# Patient Record
Sex: MALE | Race: WHITE | NOT HISPANIC OR LATINO | Employment: FULL TIME | URBAN - NONMETROPOLITAN AREA
[De-identification: names, ages, dates, MRNs, and addresses within clinical notes are randomized per-mention and may not be internally consistent; named-entity substitution may affect disease eponyms.]

---

## 2018-02-01 ENCOUNTER — DOCUMENTATION ONLY (OUTPATIENT)
Dept: FAMILY MEDICINE | Facility: OTHER | Age: 28
End: 2018-02-01

## 2018-02-01 PROBLEM — B07.9 VIRAL WARTS: Status: ACTIVE | Noted: 2018-02-01

## 2018-02-01 PROBLEM — L70.9 ACNE, MILD: Status: ACTIVE | Noted: 2018-02-01

## 2018-11-23 ENCOUNTER — OFFICE VISIT (OUTPATIENT)
Dept: FAMILY MEDICINE | Facility: OTHER | Age: 28
End: 2018-11-23
Attending: NURSE PRACTITIONER
Payer: COMMERCIAL

## 2018-11-23 VITALS
BODY MASS INDEX: 29.64 KG/M2 | SYSTOLIC BLOOD PRESSURE: 100 MMHG | WEIGHT: 200.13 LBS | DIASTOLIC BLOOD PRESSURE: 70 MMHG | HEIGHT: 69 IN | TEMPERATURE: 97.7 F | HEART RATE: 68 BPM

## 2018-11-23 DIAGNOSIS — Z23 NEED FOR TDAP VACCINATION: Primary | ICD-10-CM

## 2018-11-23 DIAGNOSIS — F41.1 GAD (GENERALIZED ANXIETY DISORDER): ICD-10-CM

## 2018-11-23 PROCEDURE — 90471 IMMUNIZATION ADMIN: CPT | Performed by: NURSE PRACTITIONER

## 2018-11-23 PROCEDURE — 90715 TDAP VACCINE 7 YRS/> IM: CPT | Performed by: NURSE PRACTITIONER

## 2018-11-23 PROCEDURE — 99212 OFFICE O/P EST SF 10 MIN: CPT | Mod: 25 | Performed by: NURSE PRACTITIONER

## 2018-11-23 RX ORDER — CITALOPRAM HYDROBROMIDE 20 MG/1
20 TABLET ORAL DAILY
Qty: 30 TABLET | Refills: 1 | Status: SHIPPED | OUTPATIENT
Start: 2018-11-23 | End: 2018-12-21

## 2018-11-23 ASSESSMENT — ANXIETY QUESTIONNAIRES
3. WORRYING TOO MUCH ABOUT DIFFERENT THINGS: NEARLY EVERY DAY
1. FEELING NERVOUS, ANXIOUS, OR ON EDGE: NEARLY EVERY DAY
6. BECOMING EASILY ANNOYED OR IRRITABLE: NEARLY EVERY DAY
2. NOT BEING ABLE TO STOP OR CONTROL WORRYING: NEARLY EVERY DAY
IF YOU CHECKED OFF ANY PROBLEMS ON THIS QUESTIONNAIRE, HOW DIFFICULT HAVE THESE PROBLEMS MADE IT FOR YOU TO DO YOUR WORK, TAKE CARE OF THINGS AT HOME, OR GET ALONG WITH OTHER PEOPLE: EXTREMELY DIFFICULT
5. BEING SO RESTLESS THAT IT IS HARD TO SIT STILL: NEARLY EVERY DAY
GAD7 TOTAL SCORE: 21
7. FEELING AFRAID AS IF SOMETHING AWFUL MIGHT HAPPEN: NEARLY EVERY DAY

## 2018-11-23 ASSESSMENT — PATIENT HEALTH QUESTIONNAIRE - PHQ9: 5. POOR APPETITE OR OVEREATING: NEARLY EVERY DAY

## 2018-11-23 ASSESSMENT — PAIN SCALES - GENERAL: PAINLEVEL: NO PAIN (0)
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
## 2018-11-23 NOTE — NURSING NOTE
"Chief Complaint   Patient presents with     Behavioral Problem     Anger         Initial /70  Pulse 68  Temp 97.7  F (36.5  C) (Temporal)  Ht 5' 9\" (1.753 m)  Wt 200 lb 2 oz (90.8 kg)  BMI 29.55 kg/m2 Estimated body mass index is 29.55 kg/(m^2) as calculated from the following:    Height as of this encounter: 5' 9\" (1.753 m).    Weight as of this encounter: 200 lb 2 oz (90.8 kg).    Medication Reconciliation: complete      Norma J. Gosselin, LPN  "

## 2018-11-23 NOTE — MR AVS SNAPSHOT
"              After Visit Summary   2018    Gloria Purvis    MRN: [id number]           Patient Information     Date Of Birth          1990        Visit Information        Provider Department      2018 11:00 AM Sue Humphrey NP [facility]        Today's Diagnoses     Need for Tdap vaccination    -  1    DURGA (generalized anxiety disorder)           Follow-ups after your visit        Follow-up notes from your care team     Return in about 4 weeks (around 2018) for Medication Check.      Who to contact     If you have questions or need follow up information about today's clinic visit or your schedule please contact [facility] directly at [phone].  Normal or non-critical lab and imaging results will be communicated to you by App47hart, letter or phone within 4 business days after the clinic has received the results. If you do not hear from us within 7 days, please contact the clinic through App47[employer] or phone. If you have a critical or abnormal lab result, we will notify you by phone as soon as possible.  Submit refill requests through [employer] or call your pharmacy and they will forward the refill request to us. Please allow 3 business days for your refill to be completed.          Additional Information About Your Visit        MyChart Information     [employer] lets you send messages to your doctor, view your test results, renew your prescriptions, schedule appointments and more. To sign up, go to [email or web address] . Click on \"Log in\" on the left side of the screen, which will take you to the Welcome page. Then click on \"Sign up Now\" on the right side of the page.     You will be asked to enter the access code listed below, as well as some personal information. Please follow the directions to create your username and password.     Your access code is: [email or web address]  Expires: 2019 12:57 PM     Your access code will  in 90 days. If you " "need help or a new code, please call your [place] clinic or [phone].        Care EveryWhere ID     This is your Care EveryWhere ID. This could be used by other organizations to access your [place] medical records  [id number]        Your Vitals Were     Pulse Temperature Height BMI (Body Mass Index)          68 97.7  F (36.5  C) (Temporal) 5' 9\" (1.753 m) 29.55 kg/m2         Blood Pressure from Last 3 Encounters:   11/23/18 100/70   01/29/16 (!) 146/92    Weight from Last 3 Encounters:   11/23/18 200 lb 2 oz (90.8 kg)   01/29/16 200 lb (90.7 kg)              We Performed the Following     GH IMM-  TDAP VACCINE (BOOSTRIX )          Today's Medication Changes          These changes are accurate as of 11/23/18 12:57 PM.  If you have any questions, ask your nurse or doctor.               Start taking these medicines.        Dose/Directions    citalopram 20 MG tablet   Commonly known as:  celeXA   Used for:  DURGA (generalized anxiety disorder)   Started by:  Sue Humphrey NP        Dose:  20 mg   Take 1 tablet (20 mg) by mouth daily Take one half tablet by mouth daily. If after 4 weeks of treatment, there is no noticeable improvement take 1 full tablet daily.   Quantity:  30 tablet   Refills:  1            Where to get your medicines      Some of these will need a paper prescription and others can be bought over the counter.  Ask your nurse if you have questions.     Bring a paper prescription for each of these medications     citalopram 20 MG tablet                Primary Care Provider Office Phone # Fax #    [facility] [phone]       [facility] [id number]        Equal Access to Services     [facility] : Ranjana Velasquez, shaji colon, qafiordaliza ley. So [facility] [phone].    ATENCIÓN: Si habla español, tiene a danielson disposición servicios gratuitos de asistencia lingüística. Llame al " [phone].    We comply with applicable federal civil rights laws and [place] laws. We do not discriminate on the basis of race, color, national origin, age, disability, sex, sexual orientation, or gender identity.            Thank you!     Thank you for choosing [facility] [place]  for your care. Our goal is always to provide you with excellent care. Hearing back from our patients is one way we can continue to improve our services. Please take a few minutes to complete the written survey that you may receive in the mail after your visit with us. Thank you!             Your Updated Medication List - Protect others around you: Learn how to safely use, store and throw away your medicines at www.disposemymeds.org.          This list is accurate as of 11/23/18 12:57 PM.  Always use your most recent med list.                   Brand Name Dispense Instructions for use Diagnosis    citalopram 20 MG tablet    celeXA    30 tablet    Take 1 tablet (20 mg) by mouth daily Take one half tablet by mouth daily. If after 4 weeks of treatment, there is no noticeable improvement take 1 full tablet daily.    DURGA (generalized anxiety disorder)

## 2018-11-24 ASSESSMENT — ANXIETY QUESTIONNAIRES: GAD7 TOTAL SCORE: 21

## 2018-12-21 ENCOUNTER — TELEPHONE (OUTPATIENT)
Dept: FAMILY MEDICINE | Facility: OTHER | Age: 28
End: 2018-12-21

## 2018-12-21 DIAGNOSIS — F41.1 GAD (GENERALIZED ANXIETY DISORDER): ICD-10-CM

## 2018-12-21 RX ORDER — CITALOPRAM HYDROBROMIDE 20 MG/1
20 TABLET ORAL DAILY
Qty: 30 TABLET | Refills: 0 | Status: SHIPPED | OUTPATIENT
Start: 2018-12-21 | End: 2019-03-11 | Stop reason: ALTCHOICE

## 2018-12-21 ASSESSMENT — ANXIETY QUESTIONNAIRES
7. FEELING AFRAID AS IF SOMETHING AWFUL MIGHT HAPPEN: NOT AT ALL
6. BECOMING EASILY ANNOYED OR IRRITABLE: NEARLY EVERY DAY
1. FEELING NERVOUS, ANXIOUS, OR ON EDGE: NEARLY EVERY DAY
5. BEING SO RESTLESS THAT IT IS HARD TO SIT STILL: NEARLY EVERY DAY
GAD7 TOTAL SCORE: 18
3. WORRYING TOO MUCH ABOUT DIFFERENT THINGS: NEARLY EVERY DAY
2. NOT BEING ABLE TO STOP OR CONTROL WORRYING: NEARLY EVERY DAY
IF YOU CHECKED OFF ANY PROBLEMS ON THIS QUESTIONNAIRE, HOW DIFFICULT HAVE THESE PROBLEMS MADE IT FOR YOU TO DO YOUR WORK, TAKE CARE OF THINGS AT HOME, OR GET ALONG WITH OTHER PEOPLE: EXTREMELY DIFFICULT

## 2018-12-21 ASSESSMENT — PATIENT HEALTH QUESTIONNAIRE - PHQ9
SUM OF ALL RESPONSES TO PHQ QUESTIONS 1-9: 8
5. POOR APPETITE OR OVEREATING: NEARLY EVERY DAY

## 2018-12-21 NOTE — TELEPHONE ENCOUNTER
Called patient with the response from Sue Humphrey NP.  Did the screening questions as requested. Sammie Robles LPN .......12/21/2018 2:13 PM

## 2018-12-21 NOTE — TELEPHONE ENCOUNTER
Please call Mac back and instruct him to take 1 full tablet daily. Also please complete a PHQ-9 and DURGA screening with him as he is out of state working and unable to come to clinic. I would like him to call me again in 4 weeks after increasing the medication, or to come to clinic if he is in the area at that time. Also please note any side effects he may have noted from the medication. I did send in a refill to [facility], he should be able to pick this up from any [facility] that is near him. Thanks!    Sue

## 2018-12-21 NOTE — TELEPHONE ENCOUNTER
Patient states she was supposed to call back and update us on how she was doing with the Celexa 10mg daily was going. She states she is not feeling a change with the medication.  She does not think it is doing anything for her at all.  She only has 1 and 1/2 pills left.  She is thinking you may increase this medication.   Cindy Younger LPN........................12/21/2018  12:58 PM

## 2018-12-22 ASSESSMENT — ANXIETY QUESTIONNAIRES: GAD7 TOTAL SCORE: 18

## 2019-01-29 DIAGNOSIS — F41.1 GAD (GENERALIZED ANXIETY DISORDER): ICD-10-CM

## 2019-01-30 NOTE — TELEPHONE ENCOUNTER
Marcela TERRAZAS sent Rx request for the following:      citalopram (CELEXA) 20 MG tablet  Sig: Take 1 tablet (20 mg) by mouth daily  Last Written Prescription Date:  12/21/18  Last Fill Quantity: 30,   # refills: 0    Last Office Visit: 11/23/18  Future Office visit: None.    Per telephone encounter, dated 12/21/18, Pt was instructed to return call 4 weeks later to follow-up on how taking 1 full tablet daily, was working for him and if he was experiencing any side effects.     Attempted to reach Patient for more information. Neither phone number listed is a working number.    Will route to Sue Humphrey for refill consideration.     Unable to complete prescription refill per RN Medication Refill Policy. Mellisa Lambert RN .............. 1/30/2019  4:29 PM

## 2019-01-31 RX ORDER — CITALOPRAM HYDROBROMIDE 20 MG/1
20 TABLET ORAL DAILY
Qty: 30 TABLET | OUTPATIENT
Start: 2019-01-31

## 2019-03-11 ENCOUNTER — OFFICE VISIT (OUTPATIENT)
Dept: FAMILY MEDICINE | Facility: OTHER | Age: 29
End: 2019-03-11
Attending: NURSE PRACTITIONER
Payer: COMMERCIAL

## 2019-03-11 VITALS
HEART RATE: 88 BPM | RESPIRATION RATE: 20 BRPM | SYSTOLIC BLOOD PRESSURE: 124 MMHG | WEIGHT: 215.25 LBS | OXYGEN SATURATION: 96 % | HEIGHT: 69 IN | DIASTOLIC BLOOD PRESSURE: 86 MMHG | BODY MASS INDEX: 31.88 KG/M2 | TEMPERATURE: 97.7 F

## 2019-03-11 DIAGNOSIS — R79.89 LOW VITAMIN D LEVEL: ICD-10-CM

## 2019-03-11 DIAGNOSIS — F41.1 GAD (GENERALIZED ANXIETY DISORDER): ICD-10-CM

## 2019-03-11 LAB
ALBUMIN SERPL-MCNC: 4.7 G/DL (ref 3.5–5.7)
ALP SERPL-CCNC: 70 U/L (ref 34–104)
ALT SERPL W P-5'-P-CCNC: 57 U/L (ref 7–52)
ANION GAP SERPL CALCULATED.3IONS-SCNC: 6 MMOL/L (ref 3–14)
AST SERPL W P-5'-P-CCNC: 35 U/L (ref 13–39)
BILIRUB SERPL-MCNC: 0.4 MG/DL (ref 0.3–1)
BUN SERPL-MCNC: 15 MG/DL (ref 7–25)
CALCIUM SERPL-MCNC: 9.9 MG/DL (ref 8.6–10.3)
CHLORIDE SERPL-SCNC: 105 MMOL/L (ref 98–107)
CO2 SERPL-SCNC: 29 MMOL/L (ref 21–31)
CREAT SERPL-MCNC: 0.93 MG/DL (ref 0.7–1.3)
DEPRECATED CALCIDIOL+CALCIFEROL SERPL-MC: 17.2 NG/ML
ERYTHROCYTE [DISTWIDTH] IN BLOOD BY AUTOMATED COUNT: 12.2 % (ref 10–15)
GFR SERPL CREATININE-BSD FRML MDRD: >90 ML/MIN/{1.73_M2}
GLUCOSE SERPL-MCNC: 95 MG/DL (ref 70–105)
HCT VFR BLD AUTO: 45.2 % (ref 40–53)
HGB BLD-MCNC: 15.9 G/DL (ref 13.3–17.7)
MCH RBC QN AUTO: 34.2 PG (ref 26.5–33)
MCHC RBC AUTO-ENTMCNC: 35.2 G/DL (ref 31.5–36.5)
MCV RBC AUTO: 97 FL (ref 78–100)
PLATELET # BLD AUTO: 324 10E9/L (ref 150–450)
POTASSIUM SERPL-SCNC: 4.4 MMOL/L (ref 3.5–5.1)
PROT SERPL-MCNC: 7.6 G/DL (ref 6.4–8.9)
RBC # BLD AUTO: 4.65 10E12/L (ref 4.4–5.9)
SODIUM SERPL-SCNC: 140 MMOL/L (ref 134–144)
TSH SERPL DL<=0.05 MIU/L-ACNC: 1.48 IU/ML (ref 0.34–5.6)
WBC # BLD AUTO: 6.4 10E9/L (ref 4–11)

## 2019-03-11 PROCEDURE — 99213 OFFICE O/P EST LOW 20 MIN: CPT | Performed by: NURSE PRACTITIONER

## 2019-03-11 PROCEDURE — 36415 COLL VENOUS BLD VENIPUNCTURE: CPT | Performed by: NURSE PRACTITIONER

## 2019-03-11 PROCEDURE — 82306 VITAMIN D 25 HYDROXY: CPT | Performed by: NURSE PRACTITIONER

## 2019-03-11 PROCEDURE — 80053 COMPREHEN METABOLIC PANEL: CPT | Performed by: NURSE PRACTITIONER

## 2019-03-11 PROCEDURE — 84443 ASSAY THYROID STIM HORMONE: CPT | Performed by: NURSE PRACTITIONER

## 2019-03-11 PROCEDURE — 85027 COMPLETE CBC AUTOMATED: CPT | Performed by: NURSE PRACTITIONER

## 2019-03-11 RX ORDER — FLUVOXAMINE MALEATE 25 MG
75 TABLET ORAL 2 TIMES DAILY
Qty: 186 TABLET | Refills: 0 | Status: SHIPPED | OUTPATIENT
Start: 2019-03-11 | End: 2019-04-18

## 2019-03-11 RX ORDER — CITALOPRAM HYDROBROMIDE 20 MG/1
20 TABLET ORAL DAILY
Qty: 93 TABLET | Status: CANCELLED | OUTPATIENT
Start: 2019-03-11

## 2019-03-11 ASSESSMENT — ANXIETY QUESTIONNAIRES
7. FEELING AFRAID AS IF SOMETHING AWFUL MIGHT HAPPEN: NOT AT ALL
GAD7 TOTAL SCORE: 15
5. BEING SO RESTLESS THAT IT IS HARD TO SIT STILL: NEARLY EVERY DAY
2. NOT BEING ABLE TO STOP OR CONTROL WORRYING: MORE THAN HALF THE DAYS
6. BECOMING EASILY ANNOYED OR IRRITABLE: NEARLY EVERY DAY
1. FEELING NERVOUS, ANXIOUS, OR ON EDGE: MORE THAN HALF THE DAYS
3. WORRYING TOO MUCH ABOUT DIFFERENT THINGS: MORE THAN HALF THE DAYS
IF YOU CHECKED OFF ANY PROBLEMS ON THIS QUESTIONNAIRE, HOW DIFFICULT HAVE THESE PROBLEMS MADE IT FOR YOU TO DO YOUR WORK, TAKE CARE OF THINGS AT HOME, OR GET ALONG WITH OTHER PEOPLE: VERY DIFFICULT

## 2019-03-11 ASSESSMENT — PATIENT HEALTH QUESTIONNAIRE - PHQ9
SUM OF ALL RESPONSES TO PHQ QUESTIONS 1-9: 16
5. POOR APPETITE OR OVEREATING: NEARLY EVERY DAY

## 2019-03-11 ASSESSMENT — MIFFLIN-ST. JEOR: SCORE: 1936.75

## 2019-03-11 ASSESSMENT — PAIN SCALES - GENERAL: PAINLEVEL: NO PAIN (0)

## 2019-03-11 NOTE — NURSING NOTE
"Chief Complaint   Patient presents with     Recheck Medication   refill      Initial /86   Pulse 88   Temp 97.7  F (36.5  C) (Temporal)   Resp 20   Ht 1.753 m (5' 9\")   Wt 97.6 kg (215 lb 4 oz)   SpO2 96%   BMI 31.79 kg/m   Estimated body mass index is 31.79 kg/m  as calculated from the following:    Height as of this encounter: 1.753 m (5' 9\").    Weight as of this encounter: 97.6 kg (215 lb 4 oz).    Medication Reconciliation: complete      Norma J. Gosselin, LPN  "

## 2019-03-11 NOTE — LETTER
March 12, 2019      Gloria Purvis  [address] [address]        Dear ,    We are writing to inform you of your test results.    Your tests overall are normal!    Your thyroid, CBC, and most of the CMP are normal.   You are deficient in vitamin D - which is the case with nearly every person who lives around here. I want you to take an over the counter vitamin D supplement that equals 2,000units of vitamin D daily. Also, one of the liver function tests (the ALT) is very minimally elevated. We should recheck both of these tests in the next 6 months to 1 year.     Resulted Orders   TSH Reflex GH   Result Value Ref Range    TSH Reflex 1.48 0.34 - 5.60 IU/mL   Vitamin D Total   Result Value Ref Range    Vitamin D Total 17.2 ng/mL      Comment:      Comments:  Deficiency:             <10 ng/mL  Insufficiency:        10-29 ng/mL  Sufficiency:          ng/mL  Possible Toxicity:     >100 ng/mL     CBC W PLT No Diff   Result Value Ref Range    WBC 6.4 4.0 - 11.0 10e9/L    RBC Count 4.65 4.4 - 5.9 10e12/L    Hemoglobin 15.9 13.3 - 17.7 g/dL    Hematocrit 45.2 40.0 - 53.0 %    MCV 97 78 - 100 fl    MCH 34.2 (H) 26.5 - 33.0 pg    MCHC 35.2 31.5 - 36.5 g/dL    RDW 12.2 10.0 - 15.0 %    Platelet Count 324 150 - 450 10e9/L   Comprehensive Metabolic Panel   Result Value Ref Range    Sodium 140 134 - 144 mmol/L    Potassium 4.4 3.5 - 5.1 mmol/L    Chloride 105 98 - 107 mmol/L    Carbon Dioxide 29 21 - 31 mmol/L    Anion Gap 6 3 - 14 mmol/L    Glucose 95 70 - 105 mg/dL    Urea Nitrogen 15 7 - 25 mg/dL    Creatinine 0.93 0.70 - 1.30 mg/dL    GFR Estimate >90 >60 mL/min/[1.73_m2]    GFR Estimate If Black >90 >60 mL/min/[1.73_m2]    Calcium 9.9 8.6 - 10.3 mg/dL    Bilirubin Total 0.4 0.3 - 1.0 mg/dL    Albumin 4.7 3.5 - 5.7 g/dL    Protein Total 7.6 6.4 - 8.9 g/dL    Alkaline Phosphatase 70 34 - 104 U/L    ALT 57 (H) 7 - 52 U/L    AST 35 13 - 39 U/L       If you have any questions or concerns, please  call the clinic at the number listed above.       Sincerely,        Sue Humphrey NP

## 2019-03-11 NOTE — PROGRESS NOTES
SUBJECTIVE:   Gloria Purvis is a 29 year old male who presents to clinic today for the following health issues:    Anxiety Follow-Up    Status since last visit: Improved; though about 1 month ago noted increased irritability around 1300 every day again    Other associated symptoms:None    Complicating factors:   Significant life event: No   Current substance abuse: None  Depression symptoms: No  DURGA-7 SCORE 2018 2018 3/11/2019   Total Score 21 18 15       DURGA-7    Amount of exercise or physical activity: None    Problems taking medications regularly: No    Medication side effects: decreased libido and performance - causing some issues in marriage    Diet: regular (no restrictions)    Problem list and histories reviewed & adjusted, as indicated.  Additional history: as documented    Patient Active Problem List   Diagnosis     Acne, mild     Viral warts     Past Surgical History:   Procedure Laterality Date     TONSILLECTOMY, ADENOIDECTOMY, COMBINED             Social History     Tobacco Use     Smoking status: Former Smoker     Packs/day: 1.00     Years: 10.00     Pack years: 10.00     Types: Cigarettes     Last attempt to quit: 2018     Years since quittin.4     Smokeless tobacco: Former User     Types: Chew   Substance Use Topics     Alcohol use: Yes     Comment: once a month     Family History   Problem Relation Age of Onset     Cancer Father 50        Cancer,pancreatic cancer diagnosis         Current Outpatient Medications   Medication Sig Dispense Refill     fluvoxaMINE (LUVOX) 25 MG tablet Take 3 tablets (75 mg) by mouth 2 times daily 186 tablet 0     No Known Allergies    Reviewed and updated as needed this visit by clinical staff  Tobacco  Allergies  Meds  Problems  Med Hx  Surg Hx  Fam Hx  Soc Hx        Reviewed and updated as needed this visit by Provider  Tobacco  Allergies  Meds  Problems  Med Hx  Surg Hx  Fam Hx  Soc Hx          ROS:  As  "above    OBJECTIVE:     /86   Pulse 88   Temp 97.7  F (36.5  C) (Temporal)   Resp 20   Ht 1.753 m (5' 9\")   Wt 97.6 kg (215 lb 4 oz)   SpO2 96%   BMI 31.79 kg/m    Body mass index is 31.79 kg/m .  GENERAL: healthy, alert and no distress  NECK: no adenopathy, no asymmetry, masses, or scars and thyroid normal to palpation  RESP: lungs clear to auscultation - no rales, rhonchi or wheezes  CV: regular rate and rhythm, normal S1 S2, no S3 or S4, no murmur, click or rub, no peripheral edema and peripheral pulses strong  MS: no gross musculoskeletal defects noted, no edema  SKIN: no suspicious lesions or rashes  NEURO: Normal strength and tone, mentation intact and speech normal  PSYCH: mentation appears normal, affect normal/bright    Diagnostic Test Results:  Results for orders placed or performed in visit on 03/11/19   TSH Reflex GH   Result Value Ref Range    TSH Reflex 1.48 0.34 - 5.60 IU/mL   Vitamin D Total   Result Value Ref Range    Vitamin D Total 17.2 ng/mL   CBC W PLT No Diff   Result Value Ref Range    WBC 6.4 4.0 - 11.0 10e9/L    RBC Count 4.65 4.4 - 5.9 10e12/L    Hemoglobin 15.9 13.3 - 17.7 g/dL    Hematocrit 45.2 40.0 - 53.0 %    MCV 97 78 - 100 fl    MCH 34.2 (H) 26.5 - 33.0 pg    MCHC 35.2 31.5 - 36.5 g/dL    RDW 12.2 10.0 - 15.0 %    Platelet Count 324 150 - 450 10e9/L   Comprehensive Metabolic Panel   Result Value Ref Range    Sodium 140 134 - 144 mmol/L    Potassium 4.4 3.5 - 5.1 mmol/L    Chloride 105 98 - 107 mmol/L    Carbon Dioxide 29 21 - 31 mmol/L    Anion Gap 6 3 - 14 mmol/L    Glucose 95 70 - 105 mg/dL    Urea Nitrogen 15 7 - 25 mg/dL    Creatinine 0.93 0.70 - 1.30 mg/dL    GFR Estimate >90 >60 mL/min/[1.73_m2]    GFR Estimate If Black >90 >60 mL/min/[1.73_m2]    Calcium 9.9 8.6 - 10.3 mg/dL    Bilirubin Total 0.4 0.3 - 1.0 mg/dL    Albumin 4.7 3.5 - 5.7 g/dL    Protein Total 7.6 6.4 - 8.9 g/dL    Alkaline Phosphatase 70 34 - 104 U/L    ALT 57 (H) 7 - 52 U/L    AST 35 13 - 39 U/L " "      ASSESSMENT/PLAN:     1. DURGA (generalized anxiety disorder)  Initially started on celexa last November, did well with significant improvement in symptoms. However, for the past month has noted that \"the medication wears off around 1300\", with symptoms worsening for the remainder of the day. Has also noted some sexual difficulties when on the celexa, would like to try something else. Comparing the side effect profiles with the patient, we decided to try fluvoxamine instead of celexa at this time. Patient continues to work out of state, often times gone for a month or longer at each job site. Patient will call clinic or make appt for approx 1 month to complete medication check and DURGA scoring. Labs today as above; ALT very minimally elevated. Recheck in 6 months-1 year, pending availability.   - Comprehensive Metabolic Panel; Future  - CBC W PLT No Diff; Future  - TSH Reflex GH  - Vitamin D Total; Future  - fluvoxaMINE (LUVOX) 25 MG tablet; Take 3 tablets (75 mg) by mouth 2 times daily  Dispense: 186 tablet; Refill: 0  - Vitamin D Total  - CBC W PLT No Diff  - Comprehensive Metabolic Panel    2. Low vitamin D level  Start OTC supplementation with 2,000units vitamin D. Recheck in the future.       Sue Humphrey NP  [facility]    "

## 2019-03-12 PROBLEM — R79.89 LOW VITAMIN D LEVEL: Status: ACTIVE | Noted: 2019-03-12

## 2019-03-12 RX ORDER — CITALOPRAM HYDROBROMIDE 20 MG/1
TABLET ORAL
Qty: 30 TABLET | OUTPATIENT
Start: 2019-03-12

## 2019-03-12 ASSESSMENT — ANXIETY QUESTIONNAIRES: GAD7 TOTAL SCORE: 15

## 2019-03-12 NOTE — TELEPHONE ENCOUNTER
Marcela GR sent Rx request for the following:      Citalopram 20 mg tablets  Sig: Take 1/2 tablets by mouth daily for 4 weeks then increase to 1 tablet if there is no noticeable improvement    Last Prescription Date:  citalopram (CELEXA) 20 MG tablet (Discontinued) 30 tablet 1 11/23/2018 12/21/2018 --   Sig - Route: Take 1 tablet (20 mg) by mouth daily Take one half tablet by mouth daily. If after 4 weeks of treatment, there is no noticeable improvement take 1 full tablet daily. - Oral     *Note, the requested sig was d/c'd on 12/21 and new sig ordered for taking 1 tablet daily. This was then d/c'd on 3/11/19; reason: Alternate therapy. The following was ordered:  fluvoxaMINE (LUVOX) 25 MG tablet 186 tablet 0 3/11/2019 4/11/2019 --   Sig - Route: Take 3 tablets (75 mg) by mouth 2 times daily - Oral     Last Office Visit:              3/11/19  Future Office visit:           None.    Per LOV Note:  Comparing the side effect profiles with the patient, we decided to try fluvoxamine instead of celexa at this time. Patient continues to work out of state, often times gone for a month or longer at each job site. Patient will call clinic or make appt for approx 1 month to complete medication check and DURGA scoring.    Refill request refused, with note to pharmacy. Unable to complete prescription refill per RN Medication Refill Policy. Mellisa Lambert RN .............. 3/12/2019  1:24 PM

## 2019-04-17 DIAGNOSIS — F41.1 GAD (GENERALIZED ANXIETY DISORDER): ICD-10-CM

## 2019-04-18 RX ORDER — FLUVOXAMINE MALEATE 25 MG
75 TABLET ORAL 2 TIMES DAILY
Qty: 180 TABLET | Refills: 0 | Status: SHIPPED | OUTPATIENT
Start: 2019-04-18 | End: 2019-05-18

## 2019-04-18 ASSESSMENT — ANXIETY QUESTIONNAIRES
4. TROUBLE RELAXING: NOT AT ALL
6. BECOMING EASILY ANNOYED OR IRRITABLE: SEVERAL DAYS
IF YOU CHECKED OFF ANY PROBLEMS ON THIS QUESTIONNAIRE, HOW DIFFICULT HAVE THESE PROBLEMS MADE IT FOR YOU TO DO YOUR WORK, TAKE CARE OF THINGS AT HOME, OR GET ALONG WITH OTHER PEOPLE: NOT DIFFICULT AT ALL
5. BEING SO RESTLESS THAT IT IS HARD TO SIT STILL: NOT AT ALL
2. NOT BEING ABLE TO STOP OR CONTROL WORRYING: NOT AT ALL
3. WORRYING TOO MUCH ABOUT DIFFERENT THINGS: NOT AT ALL
GAD7 TOTAL SCORE: 2
7. FEELING AFRAID AS IF SOMETHING AWFUL MIGHT HAPPEN: NOT AT ALL
1. FEELING NERVOUS, ANXIOUS, OR ON EDGE: SEVERAL DAYS

## 2019-04-18 NOTE — TELEPHONE ENCOUNTER
Chart review shows that patient with an  order for Rx as requested as follows:    Outpatient Medication Detail      Disp Refills Start End BETH   fluvoxaMINE (LUVOX) 25 MG tablet 186 tablet 0 3/11/2019 2019 --   Sig - Route: Take 3 tablets (75 mg) by mouth 2 times daily - Oral   Sent to pharmacy as: fluvoxaMINE (LUVOX) 25 MG tablet   Class: E-Prescribe   Order: [id number]   E-Prescribing Status: Receipt confirmed by pharmacy (3/11/2019  3:48 PM CDT)   Printout Tracking     External Result Report   Pharmacy     [id number] - [place], [place] - [address]     LOV with a provider was on 3/11/19 with Sue DA SILVA NP with plan as follows:    Patient will call clinic or make appt for approx 1 month to complete medication check and DURGA scoring. Labs today as above; ALT very minimally elevated. Recheck in 6 months-1 year, pending availability.     No follow up has been completed. Call placed to patient to discuss. Patient reports that she is really busy at this time moving and that it is difficult for her to come in. She prefers to do the GAD7 over the phone. GAD7 completed with patient at this time.     Patient reports that she is in need of a refill today if possible. Writer will godwin up and route Rx request to PRIYANK Rogers as well as her teamlet for there consideration/approval. Patient requests a 6 month supply and she will follow up then.    Unable to complete prescription refill per RN Medication Refill Policy. Jules Juarez 2019 2:27 PM

## 2019-04-19 ASSESSMENT — ANXIETY QUESTIONNAIRES: GAD7 TOTAL SCORE: 2

## 2021-12-29 ENCOUNTER — APPOINTMENT (OUTPATIENT)
Dept: GENERAL RADIOLOGY | Facility: HOSPITAL | Age: 31
End: 2021-12-29
Attending: PHYSICIAN ASSISTANT
Payer: COMMERCIAL

## 2021-12-29 ENCOUNTER — HOSPITAL ENCOUNTER (EMERGENCY)
Facility: HOSPITAL | Age: 31
Discharge: HOME | End: 2021-12-29
Attending: PHYSICIAN ASSISTANT | Admitting: PHYSICIAN ASSISTANT
Payer: COMMERCIAL

## 2021-12-29 VITALS
DIASTOLIC BLOOD PRESSURE: 82 MMHG | SYSTOLIC BLOOD PRESSURE: 125 MMHG | HEART RATE: 67 BPM | RESPIRATION RATE: 16 BRPM | TEMPERATURE: 98.8 F | OXYGEN SATURATION: 97 %

## 2021-12-29 DIAGNOSIS — J40 BRONCHITIS: ICD-10-CM

## 2021-12-29 LAB
FLUAV RNA SPEC QL NAA+PROBE: POSITIVE
FLUBV RNA RESP QL NAA+PROBE: NEGATIVE
RSV RNA SPEC NAA+PROBE: NEGATIVE
SARS-COV-2 RNA RESP QL NAA+PROBE: NEGATIVE

## 2021-12-29 PROCEDURE — 71046 X-RAY EXAM CHEST 2 VIEWS: CPT

## 2021-12-29 PROCEDURE — C9803 HOPD COVID-19 SPEC COLLECT: HCPCS

## 2021-12-29 PROCEDURE — G0463 HOSPITAL OUTPT CLINIC VISIT: HCPCS

## 2021-12-29 PROCEDURE — 87637 SARSCOV2&INF A&B&RSV AMP PRB: CPT | Performed by: PHYSICIAN ASSISTANT

## 2021-12-29 PROCEDURE — 99213 OFFICE O/P EST LOW 20 MIN: CPT | Performed by: PHYSICIAN ASSISTANT

## 2021-12-29 RX ORDER — ALBUTEROL SULFATE 90 UG/1
2 AEROSOL, METERED RESPIRATORY (INHALATION) EVERY 6 HOURS PRN
Qty: 18 G | Refills: 0 | Status: SHIPPED | OUTPATIENT
Start: 2021-12-29

## 2021-12-29 RX ORDER — ESCITALOPRAM OXALATE 5 MG/1
40 TABLET ORAL DAILY
COMMUNITY
Start: 2021-12-27

## 2021-12-29 RX ORDER — AZITHROMYCIN 250 MG/1
TABLET, FILM COATED ORAL
Qty: 6 TABLET | Refills: 0 | Status: SHIPPED | OUTPATIENT
Start: 2021-12-29 | End: 2022-01-03

## 2021-12-29 RX ORDER — PREDNISONE 20 MG/1
TABLET ORAL
Qty: 10 TABLET | Refills: 0 | Status: SHIPPED | OUTPATIENT
Start: 2021-12-29

## 2021-12-29 RX ORDER — BUSPIRONE HYDROCHLORIDE 5 MG/1
TABLET ORAL
COMMUNITY
Start: 2021-12-27 | End: 2022-09-01

## 2021-12-29 ASSESSMENT — ENCOUNTER SYMPTOMS
PALPITATIONS: 0
SINUS PAIN: 1
COUGH: 1
FATIGUE: 1
ACTIVITY CHANGE: 1
SHORTNESS OF BREATH: 1
SINUS PRESSURE: 1
FEVER: 0
RHINORRHEA: 1

## 2021-12-29 NOTE — ED TRIAGE NOTES
Pt presents with c/o productive cough. Reports that it is deep and painful. Also states he had two negative covid tests. Cough started few days ago. Pt has not tried any otc meds.

## 2021-12-29 NOTE — ED PROVIDER NOTES
History     Chief Complaint   Patient presents with     Cough     HPI  Gloria Purvis is a 31 year old male who presents for cough x 3 days duration. Deep and painful cough.     Symptoms:  No fevers or chills.   Yes sore throat/pharyngitis/tonsillitis.   Yes allergy/URI Symptoms  Yes Muffled Sounds/Change in Hearing  Yes Sensation of Fullness in Ear(s)  No Ringing in Ears/Tinnitus  No Balance Changes  No Dizziness  Yes Congestion (head/nasal/chest)  Yes Cough/Productive Cough  Yes Post Nasal Drip - color: clear  Yes Headache  Yes Sinus Pain/Pressure  No Myalgias  No Otalgia  Activity Level Changes: Yes: fatigued  Appetite/Liquid Intake Changes: No  Changes to Bowel Habits: No  Changes to Bladder Habits: No  Additional Symptoms to Report: No  History of similar symptoms: No  Prior workup: No    Treatments tried: Tylenol/Ibuprofen and OTC Cough med    Site of exposure: son  Type of exposure: colds    Cardiopulmonary History:  Recent Infections (Pneumonia, etc): No  Smoker: Yes: < 1/2 ppd  Asthma: No  COPD: No    Allergies:  No Known Allergies    Problem List:    Patient Active Problem List    Diagnosis Date Noted     Low vitamin D level 03/12/2019     Priority: Medium     Acne, mild 02/01/2018     Priority: Medium     Viral warts 02/01/2018     Priority: Medium        Past Medical History:    Past Medical History:   Diagnosis Date     Disorder of bursae and tendons in shoulder region      Other acne      Viral warts        Past Surgical History:    Past Surgical History:   Procedure Laterality Date     TONSILLECTOMY, ADENOIDECTOMY, COMBINED      12/98       Family History:    Family History   Problem Relation Age of Onset     Cancer Father 50        Cancer,pancreatic cancer diagnosis       Social History:  Marital Status:  Single [1]  Social History     Tobacco Use     Smoking status: Current Every Day Smoker     Packs/day: 0.50     Years: 10.00     Pack years: 5.00     Types: Cigarettes     Last attempt to quit:  9/23/2018     Years since quitting: 3.2     Smokeless tobacco: Former User     Types: Chew   Substance Use Topics     Alcohol use: Yes     Comment: once a month     Drug use: No        Medications:    albuterol (PROAIR HFA/PROVENTIL HFA/VENTOLIN HFA) 108 (90 Base) MCG/ACT inhaler  azithromycin (ZITHROMAX Z-ZOE) 250 MG tablet  busPIRone (BUSPAR) 5 MG tablet  escitalopram (LEXAPRO) 5 MG tablet  predniSONE (DELTASONE) 20 MG tablet  fluvoxaMINE (LUVOX) 25 MG tablet      Review of Systems   Constitutional: Positive for activity change and fatigue. Negative for fever.   HENT: Positive for congestion, rhinorrhea, sinus pressure and sinus pain.    Respiratory: Positive for cough and shortness of breath.    Cardiovascular: Negative for chest pain and palpitations.   All other systems reviewed and are negative.      Physical Exam   BP: 125/82  Pulse: 67  Temp: 98.8  F (37.1  C)  Resp: 16  SpO2: 97 %    Physical Exam  Vitals and nursing note reviewed.   HENT:      Head: Normocephalic and atraumatic.   Eyes:      Conjunctiva/sclera: Conjunctivae normal.   Cardiovascular:      Rate and Rhythm: Normal rate and regular rhythm.      Heart sounds: Normal heart sounds.   Pulmonary:      Effort: Pulmonary effort is normal.      Breath sounds: Examination of the left-lower field reveals wheezing. Wheezing present.   Musculoskeletal:         General: Normal range of motion.      Cervical back: Normal range of motion.   Skin:     General: Skin is warm and dry.      Capillary Refill: Capillary refill takes less than 2 seconds.   Neurological:      General: No focal deficit present.      Mental Status: He is alert and oriented to person, place, and time.   Psychiatric:         Mood and Affect: Mood normal.         Behavior: Behavior normal.         Thought Content: Thought content normal.         Judgment: Judgment normal.       ED Course                 Procedures              Results for orders placed or performed during the hospital  encounter of 12/29/21 (from the past 24 hour(s))   Chest XR,  PA & LAT    Narrative    PROCEDURE:  XR CHEST 2 VW    HISTORY:  cough, deep and productive.     COMPARISON:  None.    FINDINGS:   The cardiac silhouette is normal in size. The pulmonary vasculature is  normal.  The lungs are clear. No pleural effusion or pneumothorax.      Impression    IMPRESSION:  No acute cardiopulmonary disease.      LEYLA BARFIELD MD         SYSTEM ID:  [id number]       Medications - No data to display    Assessments & Plan (with Medical Decision Making)     I have reviewed the nursing notes.    I have reviewed the findings, diagnosis, plan and need for follow up with the patient.  New Prescriptions    ALBUTEROL (PROAIR HFA/PROVENTIL HFA/VENTOLIN HFA) 108 (90 BASE) MCG/ACT INHALER    Inhale 2 puffs into the lungs every 6 hours as needed for shortness of breath / dyspnea or wheezing    AZITHROMYCIN (ZITHROMAX Z-ZOE) 250 MG TABLET    Two tablets on the first day, then one tablet daily for the next 4 days    PREDNISONE (DELTASONE) 20 MG TABLET    Take two tablets (= 40mg) each day for 5 (five) days       Final diagnoses:   Bronchitis   Vital signs stable. PE consistent with URI. Testing results were as follows: clear chest on XR> Recommend: increased fluids, rest, use of humidifier, antitussives (cough medication for adults), alternating tylenol and ibuprofen every 4-6 hours as needed (if able to take these medications), salt water gargles for sore throats or throat lozenges, honey, netti pots/saline rinses for sinus/congestion, as well as other home remedies. If worsening fevers, chills, uncontrollable nausea/vomiting, dehydration,etc., or other worsening signs occur, patient is agreeable to follow up for reevaluation.     Tobacco history, high risk bronchitis - Zpak, prednisone burst and albuterol inhaler prescribed.     Patient is in agreement and understanding of the above treatment plan. All questions and concerns were addressed  and answered to patient's satisfaction. AVS reviewed with patient.     If COVID negative, OK to return to school as long as fever free without use of antipyretics x 24 hours.     If COVID positive, out for 10-14 days from symptom onset.     If strep positive, out x 1 day to allow full day on antibiotics. If strep negative (and all other pertinent testing, ie: COVID is negative) OK to return to school.     If RSV positive, out x 3-5 days from symptom onset.     Mary Lozano PA-C  12/29/2021   [facility]

## 2022-08-31 PROCEDURE — 99282 EMERGENCY DEPT VISIT SF MDM: CPT | Performed by: INTERNAL MEDICINE

## 2022-08-31 PROCEDURE — 99283 EMERGENCY DEPT VISIT LOW MDM: CPT

## 2022-09-01 ENCOUNTER — HOSPITAL ENCOUNTER (EMERGENCY)
Facility: HOSPITAL | Age: 32
Discharge: HOME | End: 2022-09-01
Attending: INTERNAL MEDICINE | Admitting: INTERNAL MEDICINE
Payer: COMMERCIAL

## 2022-09-01 VITALS
SYSTOLIC BLOOD PRESSURE: 122 MMHG | TEMPERATURE: 97.4 F | HEART RATE: 84 BPM | BODY MASS INDEX: 28.8 KG/M2 | RESPIRATION RATE: 18 BRPM | DIASTOLIC BLOOD PRESSURE: 85 MMHG | OXYGEN SATURATION: 97 % | WEIGHT: 195 LBS

## 2022-09-01 DIAGNOSIS — F43.10 PTSD (POST-TRAUMATIC STRESS DISORDER): ICD-10-CM

## 2022-09-01 DIAGNOSIS — F41.9 ANXIETY: ICD-10-CM

## 2022-09-01 PROCEDURE — 250N000013 HC RX MED GY IP 250 OP 250 PS 637: Performed by: INTERNAL MEDICINE

## 2022-09-01 RX ORDER — QUETIAPINE FUMARATE 25 MG/1
25 TABLET, FILM COATED ORAL 3 TIMES DAILY
COMMUNITY
Start: 2022-04-12

## 2022-09-01 RX ORDER — DIAZEPAM 5 MG
10 TABLET ORAL ONCE
Status: COMPLETED | OUTPATIENT
Start: 2022-09-01 | End: 2022-09-01

## 2022-09-01 RX ORDER — CLONIDINE HYDROCHLORIDE 0.1 MG/1
0.1 TABLET ORAL 3 TIMES DAILY PRN
COMMUNITY
Start: 2022-04-20

## 2022-09-01 RX ORDER — GABAPENTIN 300 MG/1
300 CAPSULE ORAL 2 TIMES DAILY
COMMUNITY
Start: 2022-05-03 | End: 2022-09-01

## 2022-09-01 RX ORDER — GABAPENTIN 400 MG/1
400 CAPSULE ORAL 3 TIMES DAILY
COMMUNITY

## 2022-09-01 RX ORDER — PRAZOSIN HYDROCHLORIDE 1 MG/1
1 CAPSULE ORAL AT BEDTIME
COMMUNITY
Start: 2022-05-03

## 2022-09-01 RX ORDER — PRAZOSIN HYDROCHLORIDE 1 MG/1
1 CAPSULE ORAL AT BEDTIME
COMMUNITY
End: 2022-09-01

## 2022-09-01 RX ADMIN — DIAZEPAM 10 MG: 5 TABLET ORAL at 00:43

## 2022-09-01 RX ADMIN — GABAPENTIN 400 MG: 300 CAPSULE ORAL at 00:45

## 2022-09-01 ASSESSMENT — ENCOUNTER SYMPTOMS
HEADACHES: 0
SLEEP DISTURBANCE: 1
CONFUSION: 0
EYE REDNESS: 0
ABDOMINAL PAIN: 0
FEVER: 0
AGITATION: 1
SHORTNESS OF BREATH: 0
COLOR CHANGE: 0
NECK STIFFNESS: 0
NERVOUS/ANXIOUS: 1
DISORGANIZED SPEECH: 0
DEPRESSED MOOD: 0
DIFFICULTY URINATING: 0
ARTHRALGIAS: 0

## 2022-09-01 ASSESSMENT — ACTIVITIES OF DAILY LIVING (ADL)
ADLS_ACUITY_SCORE: 35

## 2022-09-01 NOTE — ED TRIAGE NOTES
"Patient reports history of PTSD. He states that he ran out of medication a few days ago \"and my mind is running a million miles a minute.\" Patient notes his appt to refill medications is tomorrow, but he is here tonight d/t anger outbursts, increased depression, and people at home and work are afraid of him. He denies suicidal or homicidal thoughts.     Triage Assessment     Row Name 08/31/22 5352       Triage Assessment (Adult)    Airway WDL WDL              "

## 2022-09-01 NOTE — ED NOTES
Patient woke up on his own, states he is ready to go home. He appears calm, no agitation noted. Provider updated.

## 2022-09-01 NOTE — ED NOTES
Patient reports the medication helped his thoughts slow down. He states he believes he will be able to sleep tonight. Patient appears much more calm to writer.

## 2022-09-01 NOTE — ED PROVIDER NOTES
History     Chief Complaint   Patient presents with     Psychiatric Evaluation     Hx of PTSD - ran out of medication a few days ago.     The history is provided by the patient.   Mental Health Problem  Presenting symptoms: agitation    Presenting symptoms: no depression, no disorganized speech, no disorganized thought process, no suicidal thoughts, no suicidal threats and no suicide attempt    Onset quality:  Gradual  Timing:  Intermittent  Chronicity:  Recurrent  Associated symptoms: anxiety    Associated symptoms: no abdominal pain, no chest pain and no headaches          Allergies:  No Known Allergies    Problem List:    Patient Active Problem List    Diagnosis Date Noted     Low vitamin D level 03/12/2019     Priority: Medium     Acne, mild 02/01/2018     Priority: Medium     Viral warts 02/01/2018     Priority: Medium        Past Medical History:    Past Medical History:   Diagnosis Date     Disorder of bursae and tendons in shoulder region      Other acne      Viral warts        Past Surgical History:    Past Surgical History:   Procedure Laterality Date     TONSILLECTOMY, ADENOIDECTOMY, COMBINED      12/98       Family History:    Family History   Problem Relation Age of Onset     Cancer Father 50        Cancer,pancreatic cancer diagnosis       Social History:  Marital Status:  Single [1]  Social History     Tobacco Use     Smoking status: Current Every Day Smoker     Packs/day: 0.50     Years: 10.00     Pack years: 5.00     Types: Cigarettes     Last attempt to quit: 9/23/2018     Years since quitting: 3.9     Smokeless tobacco: Former User     Types: Chew   Substance Use Topics     Alcohol use: Yes     Comment: once a month     Drug use: No        Medications:    cloNIDine (CATAPRES) 0.1 MG tablet  escitalopram (LEXAPRO) 5 MG tablet  gabapentin (NEURONTIN) 400 MG capsule  prazosin (MINIPRESS) 1 MG capsule  QUEtiapine (SEROQUEL) 25 MG tablet  albuterol (PROAIR HFA/PROVENTIL HFA/VENTOLIN HFA) 108 (90 Base)  MCG/ACT inhaler  fluvoxaMINE (LUVOX) 25 MG tablet  predniSONE (DELTASONE) 20 MG tablet          Review of Systems   Constitutional: Negative for fever.   HENT: Negative for congestion.    Eyes: Negative for redness.   Respiratory: Negative for shortness of breath.    Cardiovascular: Negative for chest pain.   Gastrointestinal: Negative for abdominal pain.   Genitourinary: Negative for difficulty urinating.   Musculoskeletal: Negative for arthralgias and neck stiffness.   Skin: Negative for color change.   Neurological: Negative for headaches.   Psychiatric/Behavioral: Positive for agitation and sleep disturbance. Negative for confusion and suicidal ideas. The patient is nervous/anxious.        Physical Exam   BP: 118/80  Pulse: 107  Temp: 98.2  F (36.8  C)  Resp: 22  Weight: 88.5 kg (195 lb)  SpO2: 98 %      Physical Exam  Vitals and nursing note reviewed.   Constitutional:       Appearance: He is well-developed.   HENT:      Head: Normocephalic and atraumatic.   Eyes:      Conjunctiva/sclera: Conjunctivae normal.      Pupils: Pupils are equal, round, and reactive to light.   Neck:      Thyroid: No thyromegaly.      Vascular: No JVD.      Trachea: No tracheal deviation.   Cardiovascular:      Rate and Rhythm: Normal rate and regular rhythm.      Heart sounds: Normal heart sounds. No murmur heard.    No gallop.   Pulmonary:      Effort: Pulmonary effort is normal. No respiratory distress.      Breath sounds: Normal breath sounds. No stridor. No wheezing or rales.   Chest:      Chest wall: No tenderness.   Abdominal:      General: Bowel sounds are normal. There is no distension.      Palpations: Abdomen is soft. There is no mass.      Tenderness: There is no abdominal tenderness. There is no guarding or rebound.   Musculoskeletal:         General: No tenderness. Normal range of motion.      Cervical back: Normal range of motion and neck supple.   Lymphadenopathy:      Cervical: No cervical adenopathy.   Skin:      General: Skin is warm.      Coloration: Skin is not pale.      Findings: No erythema or rash.   Neurological:      Mental Status: He is alert and oriented to person, place, and time.   Psychiatric:         Attention and Perception: Attention normal.         Mood and Affect: Mood is anxious.         Behavior: Behavior normal.         Thought Content: Thought content normal. Thought content is not paranoid or delusional. Thought content does not include homicidal or suicidal ideation. Thought content does not include homicidal or suicidal plan.         Cognition and Memory: Cognition normal.         Judgment: Judgment normal.         ED Course                 Procedures      No results found for this or any previous visit (from the past 24 hour(s)).    Medications   diazepam (VALIUM) tablet 10 mg (10 mg Oral Given 9/1/22 0043)   gabapentin (NEURONTIN) capsule 400 mg (400 mg Oral Given 9/1/22 0045)       Assessments & Plan (with Medical Decision Making)   Anxiety , believes his PTSD acting up, can not sleep  Symptoms resolved after taking diazepam , gabapentin  Slept and woke up, felt very comfortable  D C home, has appointment with his therapist today at 1 pm  I have reviewed the nursing notes.    I have reviewed the findings, diagnosis, plan and need for follow up with the patient.      Discharge Medication List as of 9/1/2022  5:10 AM          Final diagnoses:   Anxiety   PTSD (post-traumatic stress disorder)       8/31/2022   [facility]     Roscoe Jj MD  09/01/22 0516

## 2024-08-28 ENCOUNTER — OFFICE VISIT (OUTPATIENT)
Dept: FAMILY MEDICINE | Facility: OTHER | Age: 34
End: 2024-08-28
Payer: COMMERCIAL

## 2024-08-28 ENCOUNTER — TELEPHONE (OUTPATIENT)
Dept: FAMILY MEDICINE | Facility: OTHER | Age: 34
End: 2024-08-28

## 2024-08-28 VITALS
BODY MASS INDEX: 29.92 KG/M2 | HEIGHT: 69 IN | WEIGHT: 202 LBS | OXYGEN SATURATION: 96 % | TEMPERATURE: 97.7 F | RESPIRATION RATE: 16 BRPM | HEART RATE: 98 BPM | DIASTOLIC BLOOD PRESSURE: 88 MMHG | SYSTOLIC BLOOD PRESSURE: 156 MMHG

## 2024-08-28 DIAGNOSIS — Z11.3 SCREEN FOR STD (SEXUALLY TRANSMITTED DISEASE): Primary | ICD-10-CM

## 2024-08-28 DIAGNOSIS — N48.9 PENILE LESION: ICD-10-CM

## 2024-08-28 LAB
C TRACH DNA SPEC QL PROBE+SIG AMP: NEGATIVE
HCV AB SERPL QL IA: NONREACTIVE
HIV 1+2 AB+HIV1 P24 AG SERPL QL IA: NONREACTIVE
N GONORRHOEA DNA SPEC QL NAA+PROBE: NEGATIVE

## 2024-08-28 PROCEDURE — 86780 TREPONEMA PALLIDUM: CPT | Mod: ZL | Performed by: NURSE PRACTITIONER

## 2024-08-28 PROCEDURE — 86696 HERPES SIMPLEX TYPE 2 TEST: CPT | Mod: ZL | Performed by: NURSE PRACTITIONER

## 2024-08-28 PROCEDURE — 86803 HEPATITIS C AB TEST: CPT | Mod: ZL | Performed by: NURSE PRACTITIONER

## 2024-08-28 PROCEDURE — 87491 CHLMYD TRACH DNA AMP PROBE: CPT | Mod: ZL | Performed by: NURSE PRACTITIONER

## 2024-08-28 PROCEDURE — 87389 HIV-1 AG W/HIV-1&-2 AB AG IA: CPT | Mod: ZL | Performed by: NURSE PRACTITIONER

## 2024-08-28 PROCEDURE — 36415 COLL VENOUS BLD VENIPUNCTURE: CPT | Mod: ZL | Performed by: NURSE PRACTITIONER

## 2024-08-28 PROCEDURE — 99203 OFFICE O/P NEW LOW 30 MIN: CPT | Performed by: NURSE PRACTITIONER

## 2024-08-28 PROCEDURE — 86695 HERPES SIMPLEX TYPE 1 TEST: CPT | Mod: ZL | Performed by: NURSE PRACTITIONER

## 2024-08-28 ASSESSMENT — PAIN SCALES - GENERAL: PAINLEVEL: NO PAIN (0)

## 2024-08-28 NOTE — NURSING NOTE
"Chief Complaint   Patient presents with    Penis/Scrotum Problem     Bumps on penis - noticed last week     Patient is concerned with stds and would like testing.    Initial BP (!) 156/88 (BP Location: Left arm, Patient Position: Sitting, Cuff Size: Adult Regular)   Pulse 98   Temp 97.7  F (36.5  C) (Tympanic)   Resp 16   Ht 1.753 m (5' 9\")   Wt 91.6 kg (202 lb)   SpO2 96%   BMI 29.83 kg/m   Estimated body mass index is 29.83 kg/m  as calculated from the following:    Height as of this encounter: 1.753 m (5' 9\").    Weight as of this encounter: 91.6 kg (202 lb).     Advance Care Directive on file? n    FOOD SECURITY SCREENING QUESTIONS:    The next two questions are to help us understand your food security.  If you are feeling you need any assistance in this area, we have resources available to support you today.    Hunger Vital Signs:  Within the past 12 months we worried whether our food would run out before we got money to buy more. Never  Within the past 12 months the food we bought just didn't last and we didn't have money to get more. Never  Danna Douglas LPN,AMENA on 8/28/2024 at 9:28 AM      Danna Douglas LPN     "

## 2024-08-28 NOTE — PROGRESS NOTES
ASSESSMENT/PLAN:     I have reviewed the nursing notes.  I have reviewed the findings, diagnosis, plan and need for follow up with the patient.        1. Penile lesion  2. Screen for STD (sexually transmitted disease)  - GC/Chlamydia by PCR  - HIV Antigen Antibody Combo  - Treponema Ab w Reflex to RPR and Titer  - Hepatitis C Screen Reflex to HCV RNA Quant and Genotype  - Herpes Simplex Virus 1 and 2 IgG    Two tiny flesh colored lesions on penile shaft and scrotal sac.  Possibly early HPV warts.  No vesicular appearance so less likely herpes.  Discussed possible treatment options including cryotherapy vs topical treatment - patient will address at his primary care visit on 8/30/24  Negative gonorrhea and chlamydia PCR tests today  Further STD testing pending today and patient to be notified of results       I explained my diagnostic considerations and recommendations to the patient, who voiced understanding and agreement with the treatment plan. All questions were answered. We discussed potential side effects of any prescribed or recommended therapies, as well as expectations for response to treatments.    Marta Phelps NP  [facility]      SUBJECTIVE:   Gloria Purvis is a 34 year old male who presents to clinic today for the following health issues:  Penile lesion    HPI  Patient with new onset penile and scrotal sac skin lesions noted about a week ago.  Denies pain or pruritus.  Lesions have not changed in size or appearance.  No further lesions have developed.  No fluid or drainage associated with the lesions.  No history of penile or groin lesions.  Patient with reported history of a cold sore x 1 in the past.  Patient reports history of many partners.  Patient reports female partners.    Denies urinary symptoms such as dysuria, hematuria, frequency or urgency.  No fevers or chills.  No abdominal pain.  No nausea or vomiting.  Normal bowel movements.        Past Medical History:    Diagnosis Date    Disorder of bursae and tendons in shoulder region     No Comments Provided    Other acne     No Comments Provided    Viral warts     No Comments Provided     Past Surgical History:   Procedure Laterality Date    TONSILLECTOMY, ADENOIDECTOMY, COMBINED           Social History     Tobacco Use    Smoking status: Every Day     Current packs/day: 0.00     Average packs/day: 0.5 packs/day for 10.0 years (5.0 ttl pk-yrs)     Types: Cigarettes     Start date: 2008     Last attempt to quit: 2018     Years since quittin.9    Smokeless tobacco: Former     Types: Chew   Substance Use Topics    Alcohol use: Yes     Comment: once a month     Current Outpatient Medications   Medication Sig Dispense Refill    cloNIDine (CATAPRES) 0.1 MG tablet Take 0.1 mg by mouth 3 times daily as needed      escitalopram (LEXAPRO) 5 MG tablet Take 40 mg by mouth daily      gabapentin (NEURONTIN) 400 MG capsule Take 400 mg by mouth 3 times daily      prazosin (MINIPRESS) 1 MG capsule Take 1 mg by mouth At Bedtime      albuterol (PROAIR HFA/PROVENTIL HFA/VENTOLIN HFA) 108 (90 Base) MCG/ACT inhaler Inhale 2 puffs into the lungs every 6 hours as needed for shortness of breath / dyspnea or wheezing (Patient not taking: Reported on 2024) 18 g 0    fluvoxaMINE (LUVOX) 25 MG tablet Take 3 tablets (75 mg) by mouth 2 times daily 180 tablet 0    predniSONE (DELTASONE) 20 MG tablet Take two tablets (= 40mg) each day for 5 (five) days (Patient not taking: Reported on 2024) 10 tablet 0    QUEtiapine (SEROQUEL) 25 MG tablet Take 25 mg by mouth 3 times daily (Patient not taking: Reported on 2024)       No Known Allergies      Past medical history, past surgical history, current medications and allergies reviewed and accurate to the best of my knowledge.        OBJECTIVE:     BP (!) 156/88 (BP Location: Left arm, Patient Position: Sitting, Cuff Size: Adult Regular)   Pulse 98   Temp 97.7  F (36.5  C)  "(Tympanic)   Resp 16   Ht 1.753 m (5' 9\")   Wt 91.6 kg (202 lb)   SpO2 96%   BMI 29.83 kg/m    Body mass index is 29.83 kg/m .        Physical Exam  General Appearance: Well appearing adult male, appropriate appearance for age. No acute distress  Respiratory: normal chest wall and respirations.  Normal effort.  No cough appreciated.  :  normal appearing external male genitale without noted swelling or erythema.  Single tiny flesh colored papular lesion on penile shaft and single lesion on left scrotal sac.    Musculoskeletal:  Equal movement of bilateral upper extremities.  Equal movement of bilateral lower extremities.  Normal gait.    Psychological: normal affect, alert, oriented, and pleasant.       Labs:  Results for orders placed or performed in visit on 08/28/24   GC/Chlamydia by PCR     Status: Normal    Specimen: Urine, Voided   Result Value Ref Range    Chlamydia Trachomatis Negative Negative    Neisseria gonorrhoeae Negative Negative    Narrative    Assay performed using [employer] real-time, reverse-transcriptase PCR.         "

## 2024-08-29 LAB
HSV1 IGG SERPL QL IA: 43 INDEX
HSV1 IGG SERPL QL IA: ABNORMAL
HSV2 IGG SERPL QL IA: 0.05 INDEX
HSV2 IGG SERPL QL IA: ABNORMAL
T PALLIDUM AB SER QL: NONREACTIVE

## 2024-09-21 ENCOUNTER — HEALTH MAINTENANCE LETTER (OUTPATIENT)
Age: 34
End: 2024-09-21

## 2025-07-17 ENCOUNTER — HOSPITAL ENCOUNTER (OUTPATIENT)
Dept: GENERAL RADIOLOGY | Facility: OTHER | Age: 35
End: 2025-07-17
Attending: NURSE PRACTITIONER

## 2025-07-17 ENCOUNTER — OFFICE VISIT (OUTPATIENT)
Dept: FAMILY MEDICINE | Facility: OTHER | Age: 35
End: 2025-07-17
Attending: NURSE PRACTITIONER

## 2025-07-17 VITALS
RESPIRATION RATE: 20 BRPM | TEMPERATURE: 98.5 F | SYSTOLIC BLOOD PRESSURE: 130 MMHG | OXYGEN SATURATION: 96 % | HEART RATE: 80 BPM | DIASTOLIC BLOOD PRESSURE: 80 MMHG

## 2025-07-17 DIAGNOSIS — M79.672 LEFT FOOT PAIN: Primary | ICD-10-CM

## 2025-07-17 PROCEDURE — 73630 X-RAY EXAM OF FOOT: CPT | Mod: LT

## 2025-07-17 RX ORDER — TRAZODONE HYDROCHLORIDE 50 MG/1
50 TABLET ORAL AT BEDTIME
COMMUNITY

## 2025-07-17 RX ORDER — CITALOPRAM HYDROBROMIDE 20 MG/1
20 TABLET ORAL DAILY
COMMUNITY
Start: 2024-10-30

## 2025-07-17 ASSESSMENT — ENCOUNTER SYMPTOMS
HEMATOLOGIC/LYMPHATIC NEGATIVE: 1
CARDIOVASCULAR NEGATIVE: 1
RESPIRATORY NEGATIVE: 1
CONSTITUTIONAL NEGATIVE: 1
JOINT SWELLING: 1
PSYCHIATRIC NEGATIVE: 1
GASTROINTESTINAL NEGATIVE: 1

## 2025-07-17 ASSESSMENT — PAIN SCALES - GENERAL: PAINLEVEL_OUTOF10: SEVERE PAIN (7)

## 2025-07-17 NOTE — PROGRESS NOTES
INITIAL WORK COMP    Gloria Purvis  1990    ASSESSMENT/PLAN    Presents to [facility] clinic with left foot pain after an accident at work today.  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  His foot was immediately sore, tender to palpation with swelling.  Patient was wearing his work boots, steel toe at the time.  Left foot x-ray obtained and shows no acute change, no fracture or dislocation.  Given patient's injury will have work note for him to be able to return to work on 7/21/2025 without restrictions.  Patient's vitals are stable and he appears nontoxic.        1. Left foot pain (Primary)    - XR Foot Left G/E 3 Views  - RICE  - May use over-the-counter Tylenol or ibuprofen PRN  - Follow up as needed for new or worsening symptoms        *A shared decision making model was used.   *Patient and/or associated parties understood and were agreeable to treatment plan.   *Plan and all results were discussed.   *Explanation of diagnosis, treatment options and risk and benefits of medications reviewed with patient.    *Time was taken to answer all questions.   *Red flags symptoms were discussed and patient was advised when they should return for reevaluation or for prompt emergency evaluation.   *Patient was given verbal and written instructions on plan of care. Instructions were printed or are available on Mychart on electronic AVS.   *We discussed potential side effects of any prescribed or recommended therapies, as well as expectations for response to treatments.  *Patient discharged in stable condition    Katerine Harper CNP  [facility]    SUBJECTIVE  CHIEF COMPLAINT/ REASON FOR VISIT  Patient presents with:  Foot Injury: Left    Work Comp     HISTORY OF PRESENT ILLNESS  Gloria Purvis is a pleasant 35 year old male presents to [facility] clinic today with left foot pain after an accident at  work today.  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  His foot was immediately sore, tender to palpation with swelling.  Patient was wearing his work boots, steel toe at the time.        I have reviewed the nursing notes.  I have reviewed allergies, medication list, problem list, and past medical history.    REVIEW OF SYSTEMS  Review of Systems   Constitutional: Negative.    HENT: Negative.     Respiratory: Negative.     Cardiovascular: Negative.    Gastrointestinal: Negative.    Genitourinary: Negative.    Musculoskeletal:  Positive for gait problem and joint swelling.        Midfoot pain at dorsum of left foot.   Skin: Negative.    Hematological: Negative.    Psychiatric/Behavioral: Negative.     All other systems reviewed and are negative.       VITAL SIGNS  Vitals:    07/17/25 1838   BP: 130/80   BP Location: Right arm   Patient Position: Sitting   Cuff Size: Adult Large   Pulse: 80   Resp: 20   Temp: 98.5  F (36.9  C)   TempSrc: Tympanic   SpO2: 96%      There is no height or weight on file to calculate BMI.      OBJECTIVE  PHYSICAL EXAM  Physical Exam  Vitals and nursing note reviewed.   Constitutional:       Appearance: Normal appearance.   HENT:      Head: Normocephalic.      Nose: Nose normal.      Mouth/Throat:      Mouth: Mucous membranes are moist.   Cardiovascular:      Rate and Rhythm: Normal rate.      Pulses: Normal pulses.   Pulmonary:      Effort: Pulmonary effort is normal.   Abdominal:      Palpations: Abdomen is soft.   Musculoskeletal:         General: Swelling, tenderness and signs of injury present. Normal range of motion.      Cervical back: Normal range of motion.   Skin:     General: Skin is warm and dry.      Capillary Refill: Capillary refill takes less than 2 seconds.      Findings: No bruising or erythema.   Neurological:      General: No focal deficit present.      Mental Status: He is  alert.          DIAGNOSTICS  Results for orders placed or performed in visit on 07/17/25   XR Foot Left G/E 3 Views     Status: None    Narrative    EXAM: XR FOOT LEFT G/E 3 VIEWS  LOCATION: [facility]  DATE: 7/17/2025    INDICATION:  Left foot pain  COMPARISON: None      Impression    IMPRESSION: The left foot is negative for fracture or dislocation. Plantar calcaneal spurring.

## 2025-07-17 NOTE — NURSING NOTE
Pt here for left foot injury at work at around 10:00 this morning.   Carol Karimi CMA (AAMA)......................7/17/2025  6:36 PM       Medication Reconciliation: complete    Carol Karimi CMA  7/17/2025 6:37 PM      FOOD SECURITY SCREENING QUESTIONS:    The next two questions are to help us understand your food security.  If you are feeling you need any assistance in this area, we have resources available to support you today.    Hunger Vital Signs:  Within the past 12 months we worried whether our food would run out before we got money to buy more. Never  Within the past 12 months the food we bought just didn't last and we didn't have money to get more. Never  Carol Karimi CMA,LPN on 7/17/2025 at 6:37 PM

## 2025-07-27 ENCOUNTER — HOSPITAL ENCOUNTER (EMERGENCY)
Facility: OTHER | Age: 35
Discharge: HOME | End: 2025-07-27
Attending: FAMILY MEDICINE

## 2025-07-27 ENCOUNTER — APPOINTMENT (OUTPATIENT)
Dept: GENERAL RADIOLOGY | Facility: OTHER | Age: 35
End: 2025-07-27
Attending: FAMILY MEDICINE
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
## 2025-07-27 DIAGNOSIS — F10.920 ALCOHOLIC INTOXICATION WITHOUT COMPLICATION: ICD-10-CM

## 2025-07-27 DIAGNOSIS — S53.402A ELBOW SPRAIN, LEFT, INITIAL ENCOUNTER: ICD-10-CM

## 2025-07-27 DIAGNOSIS — S01.311A COMPLEX LACERATION OF EAR, RIGHT, INITIAL ENCOUNTER: Primary | ICD-10-CM

## 2025-07-27 PROCEDURE — 12051 INTMD RPR FACE/MM 2.5 CM/<: CPT | Performed by: FAMILY MEDICINE

## 2025-07-27 PROCEDURE — 90715 TDAP VACCINE 7 YRS/> IM: CPT | Performed by: FAMILY MEDICINE

## 2025-07-27 PROCEDURE — 250N000009 HC RX 250: Performed by: FAMILY MEDICINE

## 2025-07-27 PROCEDURE — 250N000013 HC RX MED GY IP 250 OP 250 PS 637: Performed by: FAMILY MEDICINE

## 2025-07-27 PROCEDURE — 73080 X-RAY EXAM OF ELBOW: CPT | Mod: 26 | Performed by: RADIOLOGY

## 2025-07-27 PROCEDURE — 250N000011 HC RX IP 250 OP 636: Performed by: FAMILY MEDICINE

## 2025-07-27 PROCEDURE — 99284 EMERGENCY DEPT VISIT MOD MDM: CPT | Mod: 25 | Performed by: FAMILY MEDICINE

## 2025-07-27 PROCEDURE — 90471 IMMUNIZATION ADMIN: CPT | Performed by: FAMILY MEDICINE

## 2025-07-27 PROCEDURE — 73080 X-RAY EXAM OF ELBOW: CPT | Mod: LT

## 2025-07-27 PROCEDURE — 99283 EMERGENCY DEPT VISIT LOW MDM: CPT | Mod: 25 | Performed by: FAMILY MEDICINE

## 2025-07-27 RX ORDER — TRAMADOL HYDROCHLORIDE 50 MG/1
50 TABLET ORAL EVERY 6 HOURS PRN
Qty: 10 TABLET | Refills: 0 | Status: SHIPPED | OUTPATIENT
Start: 2025-07-27 | End: 2025-07-30

## 2025-07-27 RX ADMIN — AMOXICILLIN AND CLAVULANATE POTASSIUM 1 TABLET: 875; 125 TABLET, FILM COATED ORAL at 21:33

## 2025-07-27 RX ADMIN — CLOSTRIDIUM TETANI TOXOID ANTIGEN (FORMALDEHYDE INACTIVATED), CORYNEBACTERIUM DIPHTHERIAE TOXOID ANTIGEN (FORMALDEHYDE INACTIVATED), BORDETELLA PERTUSSIS TOXOID ANTIGEN (GLUTARALDEHYDE INACTIVATED), BORDETELLA PERTUSSIS FILAMENTOUS HEMAGGLUTININ ANTIGEN (FORMALDEHYDE INACTIVATED), BORDETELLA PERTUSSIS PERTACTIN ANTIGEN, AND BORDETELLA PERTUSSIS FIMBRIAE 2/3 ANTIGEN 0.5 ML: 5; 2; 2.5; 5; 3; 5 INJECTION, SUSPENSION INTRAMUSCULAR at 19:08

## 2025-07-27 RX ADMIN — LIDOCAINE HYDROCHLORIDE 5 ML: 10 INJECTION, SOLUTION EPIDURAL; INFILTRATION; INTRACAUDAL; PERINEURAL at 20:37

## 2025-07-27 ASSESSMENT — ENCOUNTER SYMPTOMS
HEADACHES: 0
CHILLS: 0
ABDOMINAL PAIN: 0
DIZZINESS: 0
FEVER: 0
NECK PAIN: 0
ACTIVITY CHANGE: 1
WEAKNESS: 0
SHORTNESS OF BREATH: 0
ARTHRALGIAS: 1
NECK STIFFNESS: 0
NUMBNESS: 0
VOMITING: 0
COUGH: 0
ROS SKIN COMMENTS: RIGHT EAR.
TROUBLE SWALLOWING: 0
WOUND: 1
NAUSEA: 0
LIGHT-HEADEDNESS: 0
DIFFICULTY URINATING: 0

## 2025-07-27 ASSESSMENT — COLUMBIA-SUICIDE SEVERITY RATING SCALE - C-SSRS
2. HAVE YOU ACTUALLY HAD ANY THOUGHTS OF KILLING YOURSELF IN THE PAST MONTH?: NO
6. HAVE YOU EVER DONE ANYTHING, STARTED TO DO ANYTHING, OR PREPARED TO DO ANYTHING TO END YOUR LIFE?: NO
1. IN THE PAST MONTH, HAVE YOU WISHED YOU WERE DEAD OR WISHED YOU COULD GO TO SLEEP AND NOT WAKE UP?: NO

## 2025-07-27 ASSESSMENT — ACTIVITIES OF DAILY LIVING (ADL)
ADLS_ACUITY_SCORE: 41
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
## 2025-07-27 NOTE — Clinical Note
Gloria Purvis was seen and treated in our emergency department on 7/27/2025.  He may return to work on 08/04/2025.       If you have any questions or concerns, please don't hesitate to call.      Lupe Bruno MD

## 2025-07-28 NOTE — DISCHARGE INSTRUCTIONS
Keep the ear clean and dry for 24 hours.  Then you may shower but do not keep the ear under water.  Once a day take a swab and wipe it over the stitches to decrease the amount of scabbing so the stitches are easier to get out.  Ice to the ear and the elbow for 20 minutes 3 times per day for 3 days.  Then as needed.  Watch for infection - redness, more swelling, pus, odor, increased pain.  Come for a wound check in 2 days and suture removal in 5 days.  You may use the sling for your left arm as needed.  Do not leave it on all the time or your elbow will be very stiff.  Follow-up sooner if needed.
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
## (undated) RX ORDER — LIDOCAINE HYDROCHLORIDE 10 MG/ML
INJECTION, SOLUTION EPIDURAL; INFILTRATION; INTRACAUDAL; PERINEURAL
Status: DISPENSED
Start: 2025-07-27